# Patient Record
Sex: FEMALE | Race: WHITE | Employment: FULL TIME | URBAN - METROPOLITAN AREA
[De-identification: names, ages, dates, MRNs, and addresses within clinical notes are randomized per-mention and may not be internally consistent; named-entity substitution may affect disease eponyms.]

---

## 2019-02-08 PROBLEM — Z00.00 WELL ADULT HEALTH CHECK: Status: ACTIVE | Noted: 2019-02-08

## 2019-02-08 PROBLEM — K08.89 DENTALGIA: Status: ACTIVE | Noted: 2019-02-08

## 2019-08-28 PROBLEM — R06.02 SOB (SHORTNESS OF BREATH): Status: ACTIVE | Noted: 2019-08-28

## 2019-08-28 PROBLEM — R09.82 POST-NASAL DRAINAGE: Status: ACTIVE | Noted: 2019-08-28

## 2019-08-28 PROBLEM — R05.9 COUGH: Status: ACTIVE | Noted: 2019-08-28

## 2019-08-28 PROBLEM — J06.9 URI (UPPER RESPIRATORY INFECTION): Status: ACTIVE | Noted: 2019-08-28

## 2019-09-23 PROBLEM — Z00.00 WELL ADULT HEALTH CHECK: Status: RESOLVED | Noted: 2019-02-08 | Resolved: 2019-09-23

## 2019-12-23 ENCOUNTER — HOSPITAL ENCOUNTER (OUTPATIENT)
Dept: GENERAL RADIOLOGY | Age: 24
Discharge: HOME | End: 2019-12-23
Attending: INTERNAL MEDICINE
Payer: COMMERCIAL

## 2019-12-23 DIAGNOSIS — J43.9 PULMONARY EMPHYSEMA, UNSPECIFIED EMPHYSEMA TYPE (HCC): ICD-10-CM

## 2019-12-23 PROCEDURE — 71046 X-RAY EXAM CHEST 2 VIEWS: CPT

## 2022-03-18 PROBLEM — R09.82 POST-NASAL DRAINAGE: Status: ACTIVE | Noted: 2019-08-28

## 2022-06-02 ENCOUNTER — OFFICE VISIT (OUTPATIENT)
Dept: OBGYN CLINIC | Age: 27
End: 2022-06-02
Payer: COMMERCIAL

## 2022-06-02 VITALS
DIASTOLIC BLOOD PRESSURE: 62 MMHG | HEIGHT: 67 IN | WEIGHT: 234 LBS | SYSTOLIC BLOOD PRESSURE: 104 MMHG | BODY MASS INDEX: 36.73 KG/M2

## 2022-06-02 DIAGNOSIS — N92.6 IRREGULAR MENSES: ICD-10-CM

## 2022-06-02 DIAGNOSIS — Z01.419 WELL WOMAN EXAM WITH ROUTINE GYNECOLOGICAL EXAM: Primary | ICD-10-CM

## 2022-06-02 DIAGNOSIS — Z76.89 ENCOUNTER TO ESTABLISH CARE WITH NEW DOCTOR: ICD-10-CM

## 2022-06-02 DIAGNOSIS — Z11.3 SCREEN FOR STD (SEXUALLY TRANSMITTED DISEASE): ICD-10-CM

## 2022-06-02 DIAGNOSIS — F41.9 ANXIETY: ICD-10-CM

## 2022-06-02 DIAGNOSIS — Z12.4 SCREENING FOR CERVICAL CANCER: ICD-10-CM

## 2022-06-02 PROCEDURE — 99395 PREV VISIT EST AGE 18-39: CPT | Performed by: OBSTETRICS & GYNECOLOGY

## 2022-06-02 RX ORDER — NORETHINDRONE ACETATE AND ETHINYL ESTRADIOL 1MG-20(21)
1 KIT ORAL DAILY
Qty: 1 PACKET | Refills: 12 | Status: SHIPPED | OUTPATIENT
Start: 2022-06-02

## 2022-06-02 RX ORDER — MEDROXYPROGESTERONE ACETATE 10 MG/1
10 TABLET ORAL DAILY
Qty: 30 TABLET | Refills: 3 | Status: CANCELLED | OUTPATIENT
Start: 2022-06-02

## 2022-06-02 NOTE — PROGRESS NOTES
Annual Exam  Established ptRosalio Choi   33 y.o.  1995  [id number]    Date:  2022    Presents for well woman annual exam.  Reports:  Irregular menses, LMP > 2 m ago. Is exercising more, walking, did a 5k @ [address]  Has had more anxiety over the past few weeks, multiple issues including: the end of school (works at the [employer]), moving & wedding planning (getting  2022)    Periods are: Irregular    BC:   none. Past Medical History:   Diagnosis Date    Anxiety     Healthcare maintenance     10/2021 Covid vacc complete    Menorrhagia     PCOS (polycystic ovarian syndrome)     [facility] declined her offer to be a living donor due to her diagnosis of PCOS       Past Surgical History:   Procedure Laterality Date    WISDOM TOOTH EXTRACTION         Family History   Problem Relation Age of Onset    Heart Disease Maternal Grandfather     Heart Attack Maternal Grandfather 39           [place] Thyroid Disease Mother     Thyroid Disease Maternal Aunt     Ovarian Cancer Maternal Aunt     Thyroid Disease Maternal Uncle     Broken Bones Paternal Grandmother         Hip fracture.  Breast Cancer Neg Hx     Colon Cancer Neg Hx     Thyroid Disease Maternal Grandmother     Diabetes Maternal Grandfather     Hypertension Maternal Grandfather        OB History   No obstetric history on file. Social History     Socioeconomic History    Marital status: Single     Spouse name: None    Number of children: None    Years of education: None    Highest education level: None   Occupational History    None   Tobacco Use    Smoking status: Never Smoker    Smokeless tobacco: Never Used   Vaping Use    Vaping Use: Never used   Substance and Sexual Activity    Alcohol use: No    Drug use: No    Sexual activity: Not Currently     Partners: Male     Birth control/protection: None   Other Topics Concern    None   Social History Narrative    1.   PCP is Dr. Eddie [facility] Previously saw Luiza Harris @ [facility]       Social Determinants of Health     Financial Resource Strain:     Difficulty of Paying Living Expenses: Not on file   Food Insecurity:     Worried About [address] in the Last Year: Not on file    Essence of Food in the Last Year: Not on file   Transportation Needs:     Lack of Transportation (Medical): Not on file    Lack of Transportation (Non-Medical): Not on file   Physical Activity:     Days of Exercise per Week: Not on file    Minutes of Exercise per Session: Not on file   Stress:     Feeling of Stress : Not on file   Social Connections:     Frequency of Communication with Friends and Family: Not on file    Frequency of Social Gatherings with Friends and Family: Not on file    Attends Yazdanism Services: Not on file    Active Member of [address] [place] [address] or Organizations: Not on file    Attends Club or Organization Meetings: Not on file    Marital Status: Not on file   Intimate Partner Violence:     Fear of Current or Ex-Partner: Not on file    Emotionally Abused: Not on file    Physically Abused: Not on file    Sexually Abused: Not on file   Housing Stability:     Unable to Pay for Housing in the Last Year: Not on file    Number of [place] in the Last Year: Not on file    Unstable Housing in the Last Year: Not on file             Current Outpatient Medications   Medication Sig    escitalopram (LEXAPRO) 10 MG tablet Take 1/2 tablet po for 7 days then take 1 tablet po daily    loratadine (CLARITIN) 10 MG tablet Take 10 mg by mouth daily as needed     No current facility-administered medications for this visit. Review of Systems   Constitutional:        Trouble losing weight   Genitourinary: Positive for menstrual problem. Anovulatory menstrual pattern (prolonged interval between menses)   Psychiatric/Behavioral:        Anxiety     Updated from patient's \"Review of Systems\" form that patient completed.        Physical Exam:/62   Ht 5' 7\" (1.702 m)   Wt 234 lb (106.1 kg)   LMP 03/27/2022 (Exact Date)   BMI 36.65 kg/m² , Patient's last menstrual period was 03/27/2022 (exact date). Patient is a 33 y.o. female who appears pleasant, in no apparent distress, her given age, well developed, well nourished and with good attention to hygiene and body habitus. Oriented to person, place and time. Mood and affect normal and appropriate to situation. Head is normocephalic, atraumatic, without any gross head or neck masses. Neck: Neck exam reveals no abnormalities. Thyroid ~ symmetric, no abnormalities. Lymph nodes:   Neck lymph nodes are normal.   No supraclavicular lymphadenopathy noted. No axillary lymphadenopathy noted. No groin lymphadenopathy noted. Respiratory: Assessment of respiratory effort reveals even and non labored respirations. Lungs CTA. Cardiovascular: Heart auscultation reveals no murmurs, gallop, rubs or clicks. Skin: No skin rash, abnormal appearing nevi, subcutaneous nodules, lesions or ulcers observed. Abdomen: Abdomen soft, non tender, without palpable masses. Palpation of liver reveals no abnormalities with respect to size, tenderness or masses. Palpation of spleen reveals no abnormalities with respect to size, tenderness or masses. Breast: Symmetrical, no: dimpling, retractions, adenopathy, dominant masses or nipple discharge elicited. Breasts show no palpable masses or tenderness. Bilateral Fibrocystic change is: mild-moderate  No changes suspicious for malignancy      Genitourinary:  External genitalia are normal in appearance. Examination of urethral meatus reveals location normal and size normal.   Examination of urethra shows no abnormalities. Examination of vaginal vault reveals no abnormalities. Cervix: shows no pathology. Uterus:  Normal size, shape and contour.   Suboptimal bimanual exam secondary to BMI   Adnexa and parametria show no masses, tenderness, organomegaly or nodularity. Suboptimal bimanual exam secondary to BMI    Examination of anus and perineum shows no abnormalities. A/P:    1.  AE. Importance of self breast exam reviewed, pt educated how to perform SBE.      2.  Patient encouraged to establish care with a PCP, referral made per patient request.      3.  COVID & Tdap vaccines current. Flu vaccine is not current    4. No h/o abnormal paps. Patient requests pap today. 5.  Irregular menses previously took OCs continuously, experienced: Weight gain, BTB and nausea. Discussed options for cycle control, patient wants OCs. No contraindications to OCs, Loestrin rxd. No contraindication to combined hormones. No migraines, heart/liver disease, strong family hx or personal hx of clotting disorder. Discussed risk of VTE, stroke with OCPs. Advised her to to try x 3 months before deciding if she likes it or not. Common side effects discussed. Proper use of combination oral contraceptives reviewed. Need for barrier contraception during the first month of OC use discussed. Protocol for missed pills reviewed. Also discussed:  Sunday start vs starting today reviewed. She was advised to let me know if she experiences adverse side effects. Reviewed expectations re OCs impact on her cycle, she understands it may take 3-6 m to see much difference. I have discussed in detail with the patient the health risks and benefits of contraceptives, including the slight increased risk for thromboembolic disease and stroke, as well as  increased risk for blood clots with family history and or tobacco use. The patient was informed of no current evidence of increased risk for breast cancer as well as studies that suggest a decreased of risk for ovarian cancer and uterine cancer with long term use of contraceptives. 6.   Anxiety, treatment options: CBT techniques discussed at length  and/or medication. Wants to try Lexapro    7.   Trouble losing weight patient, commended for walking, suggested she try to incorporate a little bit of a jog walk into her exercise routine also consider weight watchers, is often very helpful for patients. Wt Readings from Last 3 Encounters: Wt Readings from Last 3 Encounters:   06/02/22 234 lb (106.1 kg)   10/26/21 227 lb (103 kg)   06/23/21 225 lb (102.1 kg)       BP Readings from Last 3 Encounters:  BP Readings from Last 3 Encounters:   06/02/22 104/62   10/26/21 118/84   06/23/21 108/66         Diagnosis Orders   1. Well woman exam with routine gynecological exam     2. Irregular menses     3. BMI 36.0-36.9,adult     4. Anxiety     5. Encounter to establish care with new doctor  [address] [place] [address] Internal Medicine, [place]   6. Screening for cervical cancer  PAP LB, Reflex HPV ASCUS    PAP IG, CT-NG-TV, rfx Aptima HPV ASCUS ([id number])    PAP IG, CT-NG-TV, rfx Aptima HPV ASCUS ([id number])   7. Screen for STD (sexually transmitted disease)  PAP IG, CT-NG-TV, rfx Aptima HPV ASCUS ([id number])    PAP IG, CT-NG-TV, rfx Aptima HPV ASCUS ([id number])     Dictated using voice recognition software.   Proofread but unrecognized errors may exist.    Signed by:  Eric Escamilla MD, MD

## 2022-06-08 LAB
C TRACH RRNA CVX QL NAA+PROBE: NEGATIVE
CYTOLOGIST CVX/VAG CYTO: NORMAL
CYTOLOGY CVX/VAG DOC THIN PREP: NORMAL
HPV REFLEX: NORMAL
Lab: NORMAL
N GONORRHOEA RRNA CVX QL NAA+PROBE: NEGATIVE
PATH REPORT.FINAL DX SPEC: NORMAL
STAT OF ADQ CVX/VAG CYTO-IMP: NORMAL
T VAGINALIS RRNA SPEC QL NAA+PROBE: NEGATIVE

## 2022-06-21 ENCOUNTER — TELEPHONE (OUTPATIENT)
Dept: OBGYN CLINIC | Age: 27
End: 2022-06-21

## 2022-06-21 RX ORDER — ESCITALOPRAM OXALATE 20 MG/1
20 TABLET ORAL DAILY
Qty: 30 TABLET | Refills: 3 | Status: SHIPPED | OUTPATIENT
Start: 2022-06-21 | End: 2022-08-17 | Stop reason: SDUPTHER

## 2022-06-21 NOTE — TELEPHONE ENCOUNTER
LM Rx sent in and to follow up with PCP in 2-4 weekd      Regarding note below increase Lexapro to 20 mg/day. Lucas Cutler needs to follow-up with her PCP in the next 2-4 weeks to discuss further.  Let her know it is  better to have her PCP manage this regarding future management decisions (dosing changes and or changing meds).

## 2022-06-22 ENCOUNTER — TELEPHONE (OUTPATIENT)
Dept: OBGYN CLINIC | Age: 27
End: 2022-06-22

## 2022-06-22 NOTE — TELEPHONE ENCOUNTER
Returned patient call. She wasn't sure if new dosage of Lexapro was sent to her pharmacy. Informed this was sent in yesterday.

## 2022-07-05 ENCOUNTER — OFFICE VISIT (OUTPATIENT)
Dept: INTERNAL MEDICINE CLINIC | Facility: CLINIC | Age: 27
End: 2022-07-05
Payer: COMMERCIAL

## 2022-07-05 VITALS
HEIGHT: 67 IN | HEART RATE: 74 BPM | BODY MASS INDEX: 36.41 KG/M2 | SYSTOLIC BLOOD PRESSURE: 116 MMHG | RESPIRATION RATE: 17 BRPM | DIASTOLIC BLOOD PRESSURE: 74 MMHG | WEIGHT: 232 LBS | OXYGEN SATURATION: 98 %

## 2022-07-05 DIAGNOSIS — E66.09 CLASS 2 OBESITY DUE TO EXCESS CALORIES WITHOUT SERIOUS COMORBIDITY WITH BODY MASS INDEX (BMI) OF 36.0 TO 36.9 IN ADULT: Primary | ICD-10-CM

## 2022-07-05 DIAGNOSIS — F41.9 ANXIETY: ICD-10-CM

## 2022-07-05 DIAGNOSIS — E28.2 PCOS (POLYCYSTIC OVARIAN SYNDROME): ICD-10-CM

## 2022-07-05 LAB
25(OH)D3 SERPL-MCNC: 27.6 NG/ML (ref 30–100)
ALBUMIN SERPL-MCNC: 4 G/DL (ref 3.5–5)
ALBUMIN/GLOB SERPL: 1.1 {RATIO} (ref 1.2–3.5)
ALP SERPL-CCNC: 124 U/L (ref 50–136)
ALT SERPL-CCNC: 22 U/L (ref 12–65)
ANION GAP SERPL CALC-SCNC: 6 MMOL/L (ref 7–16)
AST SERPL-CCNC: 12 U/L (ref 15–37)
BASOPHILS # BLD: 0 K/UL (ref 0–0.2)
BASOPHILS NFR BLD: 1 % (ref 0–2)
BILIRUB SERPL-MCNC: 0.5 MG/DL (ref 0.2–1.1)
BUN SERPL-MCNC: 12 MG/DL (ref 6–23)
CALCIUM SERPL-MCNC: 9.3 MG/DL (ref 8.3–10.4)
CHLORIDE SERPL-SCNC: 107 MMOL/L (ref 98–107)
CHOLEST SERPL-MCNC: 210 MG/DL
CO2 SERPL-SCNC: 25 MMOL/L (ref 21–32)
CREAT SERPL-MCNC: 0.8 MG/DL (ref 0.6–1)
DIFFERENTIAL METHOD BLD: NORMAL
EOSINOPHIL # BLD: 0.1 K/UL (ref 0–0.8)
EOSINOPHIL NFR BLD: 2 % (ref 0.5–7.8)
ERYTHROCYTE [DISTWIDTH] IN BLOOD BY AUTOMATED COUNT: 13.8 % (ref 11.9–14.6)
GLOBULIN SER CALC-MCNC: 3.8 G/DL (ref 2.3–3.5)
GLUCOSE SERPL-MCNC: 85 MG/DL (ref 65–100)
HCT VFR BLD AUTO: 39.2 % (ref 35.8–46.3)
HDLC SERPL-MCNC: 59 MG/DL (ref 40–60)
HDLC SERPL: 3.6 {RATIO}
HGB BLD-MCNC: 12.6 G/DL (ref 11.7–15.4)
IMM GRANULOCYTES # BLD AUTO: 0 K/UL (ref 0–0.5)
IMM GRANULOCYTES NFR BLD AUTO: 0 % (ref 0–5)
LDLC SERPL CALC-MCNC: 133.2 MG/DL
LYMPHOCYTES # BLD: 2.2 K/UL (ref 0.5–4.6)
LYMPHOCYTES NFR BLD: 34 % (ref 13–44)
MAGNESIUM SERPL-MCNC: 2.6 MG/DL (ref 1.8–2.4)
MCH RBC QN AUTO: 29.2 PG (ref 26.1–32.9)
MCHC RBC AUTO-ENTMCNC: 32.1 G/DL (ref 31.4–35)
MCV RBC AUTO: 90.7 FL (ref 79.6–97.8)
MONOCYTES # BLD: 0.3 K/UL (ref 0.1–1.3)
MONOCYTES NFR BLD: 5 % (ref 4–12)
NEUTS SEG # BLD: 3.8 K/UL (ref 1.7–8.2)
NEUTS SEG NFR BLD: 58 % (ref 43–78)
NRBC # BLD: 0 K/UL (ref 0–0.2)
PLATELET # BLD AUTO: 285 K/UL (ref 150–450)
PMV BLD AUTO: 11 FL (ref 9.4–12.3)
POTASSIUM SERPL-SCNC: 4.5 MMOL/L (ref 3.5–5.1)
PROT SERPL-MCNC: 7.8 G/DL (ref 6.3–8.2)
RBC # BLD AUTO: 4.32 M/UL (ref 4.05–5.2)
SODIUM SERPL-SCNC: 138 MMOL/L (ref 136–145)
TRIGL SERPL-MCNC: 89 MG/DL (ref 35–150)
TSH, 3RD GENERATION: 0.68 UIU/ML (ref 0.36–3.74)
VIT B12 SERPL-MCNC: 292 PG/ML (ref 193–986)
VLDLC SERPL CALC-MCNC: 17.8 MG/DL (ref 6–23)
WBC # BLD AUTO: 6.4 K/UL (ref 4.3–11.1)

## 2022-07-05 PROCEDURE — 99214 OFFICE O/P EST MOD 30 MIN: CPT | Performed by: NURSE PRACTITIONER

## 2022-07-05 ASSESSMENT — ANXIETY QUESTIONNAIRES
6. BECOMING EASILY ANNOYED OR IRRITABLE: 0
2. NOT BEING ABLE TO STOP OR CONTROL WORRYING: 0
7. FEELING AFRAID AS IF SOMETHING AWFUL MIGHT HAPPEN: 1
5. BEING SO RESTLESS THAT IT IS HARD TO SIT STILL: 0
IF YOU CHECKED OFF ANY PROBLEMS ON THIS QUESTIONNAIRE, HOW DIFFICULT HAVE THESE PROBLEMS MADE IT FOR YOU TO DO YOUR WORK, TAKE CARE OF THINGS AT HOME, OR GET ALONG WITH OTHER PEOPLE: SOMEWHAT DIFFICULT
4. TROUBLE RELAXING: 1
GAD7 TOTAL SCORE: 3
3. WORRYING TOO MUCH ABOUT DIFFERENT THINGS: 0
1. FEELING NERVOUS, ANXIOUS, OR ON EDGE: 1

## 2022-07-05 ASSESSMENT — PATIENT HEALTH QUESTIONNAIRE - PHQ9
SUM OF ALL RESPONSES TO PHQ QUESTIONS 1-9: 0
SUM OF ALL RESPONSES TO PHQ9 QUESTIONS 1 & 2: 0
1. LITTLE INTEREST OR PLEASURE IN DOING THINGS: 0
2. FEELING DOWN, DEPRESSED OR HOPELESS: 0

## 2022-07-05 NOTE — PROGRESS NOTES
Mayda Maher (:  1995) is a 33 y.o. female,Established patient, here for evaluation of the following chief complaint(s):  New Patient         ASSESSMENT/PLAN:  1. Class 2 obesity due to excess calories without serious comorbidity with body mass index (BMI) of 36.0 to 36.9 in adult  -     CBC with Auto Differential; Future  -     Comprehensive Metabolic Panel; Future  -     TSH; Future  -     Lipid Panel; Future  2. PCOS (polycystic ovarian syndrome)  -     CBC with Auto Differential; Future  -     Comprehensive Metabolic Panel; Future  -     TSH; Future  -     Lipid Panel; Future  3. Anxiety  -     CBC with Auto Differential; Future  -     Comprehensive Metabolic Panel; Future  -     TSH; Future  -     Vitamin D 25 Hydroxy; Future  -     Vitamin B12; Future  -     Magnesium; Future      Return in about 1 month (around 2022), or if symptoms worsen or fail to improve. Check lab  Continue OCP for PCOS   Since CHANTALE score mild after a week and half on 20mg continue the 20mg lexapro  Counseled on sleep hygiene, healthy diet, low sugar, hydration, and exercise to improve anxiety and depression  She has counseling appt set up starting tomorrow  Encourage to continue counseling and medication  F/u 4 week to see if SSRI has steadied and if anxiety continued improvement    Subjective   SUBJECTIVE/OBJECTIVE:  Patient is here for new patient appt. She wants to establish for primary care. She has hx of PCOS and just started OCP. She was referred here by her GYN because of her anxiety. She is on lexapro. She has been taking 10mg since October but then about 2 weeks ago switched to 20 mg. She feels a little better on the 20mg of lexapro. She still struggles with the anxiety, it depends on the day. Some days she feels anxious or stressed. Review of Systems       Objective   Physical Exam  Vitals reviewed. Constitutional:       Appearance: Normal appearance. She is obese.    HENT:      Head: Normocephalic. Right Ear: External ear normal.      Left Ear: External ear normal.   Eyes:      Extraocular Movements: Extraocular movements intact. Pupils: Pupils are equal, round, and reactive to light. Cardiovascular:      Rate and Rhythm: Normal rate and regular rhythm. Pulmonary:      Effort: Pulmonary effort is normal.      Breath sounds: Normal breath sounds. Musculoskeletal:      Cervical back: Neck supple. Skin:     General: Skin is warm and dry. Neurological:      General: No focal deficit present. Mental Status: She is alert and oriented to person, place, and time. Psychiatric:         Mood and Affect: Mood normal.         Behavior: Behavior normal.                  An electronic signature was used to authenticate this note.     --Laurita Brothers, YENI - CNP

## 2022-07-06 RX ORDER — CHOLECALCIFEROL (VITAMIN D3) 50 MCG
2000 TABLET ORAL DAILY
Qty: 30 TABLET | Refills: 5 | Status: SHIPPED | OUTPATIENT
Start: 2022-07-06

## 2022-08-17 ENCOUNTER — OFFICE VISIT (OUTPATIENT)
Dept: INTERNAL MEDICINE CLINIC | Facility: CLINIC | Age: 27
End: 2022-08-17
Payer: COMMERCIAL

## 2022-08-17 VITALS
HEIGHT: 67 IN | BODY MASS INDEX: 36.73 KG/M2 | DIASTOLIC BLOOD PRESSURE: 70 MMHG | WEIGHT: 234 LBS | SYSTOLIC BLOOD PRESSURE: 110 MMHG | HEART RATE: 85 BPM | OXYGEN SATURATION: 99 %

## 2022-08-17 DIAGNOSIS — E28.2 PCOS (POLYCYSTIC OVARIAN SYNDROME): ICD-10-CM

## 2022-08-17 DIAGNOSIS — E66.09 CLASS 2 OBESITY DUE TO EXCESS CALORIES WITHOUT SERIOUS COMORBIDITY WITH BODY MASS INDEX (BMI) OF 36.0 TO 36.9 IN ADULT: ICD-10-CM

## 2022-08-17 DIAGNOSIS — F41.9 ANXIETY: Primary | ICD-10-CM

## 2022-08-17 PROCEDURE — 99214 OFFICE O/P EST MOD 30 MIN: CPT | Performed by: NURSE PRACTITIONER

## 2022-08-17 RX ORDER — ESCITALOPRAM OXALATE 20 MG/1
20 TABLET ORAL DAILY
Qty: 90 TABLET | Refills: 3 | Status: SHIPPED | OUTPATIENT
Start: 2022-08-17

## 2022-08-17 ASSESSMENT — PATIENT HEALTH QUESTIONNAIRE - PHQ9
SUM OF ALL RESPONSES TO PHQ9 QUESTIONS 1 & 2: 0
SUM OF ALL RESPONSES TO PHQ QUESTIONS 1-9: 0
2. FEELING DOWN, DEPRESSED OR HOPELESS: 0
SUM OF ALL RESPONSES TO PHQ QUESTIONS 1-9: 0
1. LITTLE INTEREST OR PLEASURE IN DOING THINGS: 0
SUM OF ALL RESPONSES TO PHQ QUESTIONS 1-9: 0
SUM OF ALL RESPONSES TO PHQ QUESTIONS 1-9: 0

## 2022-08-17 ASSESSMENT — ANXIETY QUESTIONNAIRES
4. TROUBLE RELAXING: 0
1. FEELING NERVOUS, ANXIOUS, OR ON EDGE: 1
3. WORRYING TOO MUCH ABOUT DIFFERENT THINGS: 1
GAD7 TOTAL SCORE: 3
6. BECOMING EASILY ANNOYED OR IRRITABLE: 0
2. NOT BEING ABLE TO STOP OR CONTROL WORRYING: 1
7. FEELING AFRAID AS IF SOMETHING AWFUL MIGHT HAPPEN: 0
5. BEING SO RESTLESS THAT IT IS HARD TO SIT STILL: 0

## 2022-08-17 NOTE — PROGRESS NOTES
Trish Perdomo (:  1995) is a 33 y.o. female,Established patient, here for evaluation of the following chief complaint(s):  Follow-up         ASSESSMENT/PLAN:  1. Anxiety  2. PCOS (polycystic ovarian syndrome)  -     CBC with Auto Differential; Future  -     Comprehensive Metabolic Panel; Future  -     Hemoglobin A1C; Future  -     Lipid Panel; Future  -     TSH; Future  3. Class 2 obesity due to excess calories without serious comorbidity with body mass index (BMI) of 36.0 to 36.9 in adult  -     CBC with Auto Differential; Future  -     Comprehensive Metabolic Panel; Future  -     Hemoglobin A1C; Future  -     Lipid Panel; Future  -     TSH; Future      Return in about 1 year (around 2023) for with lab. Anxiety is much improved, depression improved, continue lexapro   Continue OCP per GYN as her cycles have improved  Reviewed lab  Glucose and renal function normal  Work on [employer], exercise, a few lb of weight loss  1 year f/up or as needed    Subjective   SUBJECTIVE/OBJECTIVE:  Patient is here for follow up. She is feeling good on lexapro 20mg. She also feels more energy and thinks the vitamin d is helping too. Her periods have started to get lighter on the OCP. Review of Systems       Objective   Physical Exam  Constitutional:       Appearance: Normal appearance. She is obese. She is not ill-appearing. HENT:      Head: Normocephalic. Eyes:      Extraocular Movements: Extraocular movements intact. Pupils: Pupils are equal, round, and reactive to light. Cardiovascular:      Rate and Rhythm: Normal rate and regular rhythm. Pulmonary:      Effort: Pulmonary effort is normal.      Breath sounds: Normal breath sounds. Musculoskeletal:      Cervical back: Neck supple. Neurological:      General: No focal deficit present. Mental Status: She is alert and oriented to person, place, and time.    Psychiatric:         Mood and Affect: Mood normal. Behavior: Behavior normal.                An electronic signature was used to authenticate this note.     --YENI Aviles - CNP

## 2022-11-08 RX ORDER — CHOLECALCIFEROL (VITAMIN D3) 50 MCG
TABLET ORAL
Qty: 90 TABLET | Refills: 1 | Status: SHIPPED | OUTPATIENT
Start: 2022-11-08